# Patient Record
(demographics unavailable — no encounter records)

---

## 2024-10-30 NOTE — HISTORY OF PRESENT ILLNESS
[FreeTextEntry1] : Mohs surgery for nBCC on the L lower eyelid [de-identified] : 10/29/2024  Referred by: Dr. Kiara Clarke  We had the pleasure of seeing your patient for Mohs Micrographic Surgery.  Joyce Sanchez is an 83yoF who presents for Mohs surgery for a nBCC on the L lower eyelid. Biopsied by Dr. Clarke 6/17/24. Present at least a year.  Closure set with Dr. May  s/p MMS for a nBCC on the L chin 07/23/2024 Has had Mohs surgery 5+ years ago on the R temple; not sure what type of skin cancer she had.   Social History: Retired. Lives alone in [place]. 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 One of her nephews is a pediatric surgeon in [place]. Upcoming travel plans: May go to [facility] in September or October for the birth of her other nephew's child.   Blood thinners: None Allergies: None Has hypertension, took her antihypertensive this AM

## 2024-10-30 NOTE — HISTORY OF PRESENT ILLNESS
[FreeTextEntry1] : Mohs surgery for nBCC on the L lower eyelid [de-identified] : 10/29/2024  Referred by: Dr. Kiara Clarke  We had the pleasure of seeing your patient for Mohs Micrographic Surgery.  Joyce Sanchez is an 83yoF who presents for Mohs surgery for a nBCC on the L lower eyelid. Biopsied by Dr. Clarke 6/17/24. Present at least a year.  Closure set with Dr. May  s/p MMS for a nBCC on the L chin 07/23/2024 Has had Mohs surgery 5+ years ago on the R temple; not sure what type of skin cancer she had.   Social History: Retired. Lives alone in [place]. 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 One of her nephews is a pediatric surgeon in [place]. Upcoming travel plans: May go to [facility] in September or October for the birth of her other nephew's child.   Blood thinners: None Allergies: None Has hypertension, took her antihypertensive this AM

## 2024-10-30 NOTE — PHYSICAL EXAM
[Alert] : alert [Oriented x 3] : ~L oriented x 3 [Well Nourished] : well nourished [FreeTextEntry3] : L lower eyelid - 0.8x0.8 cm pink patch

## 2024-11-04 NOTE — PHYSICAL EXAM
[Alert] : alert [Oriented x 3] : ~L oriented x 3 [Conjunctiva Non-injected] : conjunctiva non-injected [Full Body Skin Exam Performed] : performed [Well Nourished] : well nourished [FreeTextEntry3] :  Full body skin exam Total body skin exam performed including hair, scalp, face, neck, chest/breasts/axillae, abdomen, back, R upper ext, L upper ext, buttock/groin, R lower ext, L lower ext, hands, feet, digits/nails. The following of note -pink scaly papule b/l temples x2 along hairline -tan/brown macules scattered throughout trunk/extremities, no concerning features on dermoscopy -tan/brown greasy stuck-on papules scattered throughout trunk/extremities -Mohs scar L chin and L lower eyelid, healing w/o e/o recurrence  -L medial lower leg with bony overgrowth at L medial knee

## 2024-11-04 NOTE — HISTORY OF PRESENT ILLNESS
[FreeTextEntry1] : Fu - dermatitis; flaking [de-identified] : 82 yo F presenting for:   #Flaking along hairline - 3/27/23: used the keto cream to the hairline which helped for about two weeks but since then has progressed and is very itchy. She is using a sulfate free tammi shampoo daily  - 7/26/23: used Ciclopirox once, uses hydrocort BID PRN in cycles with improvement on forehead. Mometasone PRN works on scalp - 6/2024: scalp stable with ciclopirox and mometasone solution PRN, hairline using HC PRN few days at a time but not going away completely - 11/2024: scalp stable, uses ciclopirox prn, notes improvement in itch   #H/o nBCC Chin s/p Mohs 07/2024 L lower eyelid s/p Mohs and plastic surgery repair (w/ Dr Chery) 10/29/2024  #Moles no new, concerning spots requesting fbse   Personal hx of skin cancer: NMSC on R temple s/p Mohs many years ago; BCC on chin s/p Mohs 07/2024; BCC on L lower eyelid s/p Mohs and plastic surgery repair (w/ Dr Chery) 10/29/2024 FHx of skin cancer: none Social Hx: retired

## 2024-11-04 NOTE — ASSESSMENT
[FreeTextEntry1] : #Actinic keratosis, b/l temples x2 I discussed the pre cancerous nature of this lesion with patient. Cryotherapy performed today as below   The patient was informed of the pathophysiology of their lesions and their treatment course with liquid nitrogen (cryosurgery). Side effects include blister formation, hypopigmentation, and scarring, as well as permanent nail dystrophy if applicable. Patient was verbally consented and the lesions identified above were treated with liquid nitrogen freeze, thaw, freeze each cycle x 2. The patient tolerated the procedure well. Wound care instructions, care of a blister with vaseline, signs and symptoms of infection were discussed in full. The patient denies any questions at this time.  #Seborrheic dermatitis, scalp and forehead, severe - chronic; stable though with recurrent flares ?component of ACD to hair dye, though pt denies irritation with dye use - Diagnosis, chronic nature, disease course, treatment options and goals of therapy discussed - Forehead:  C/w hydrocortisone 2.5% ointment BID PRN for 1 week on/1 week OFF cycles. SED. - Scalp: C/w ciclopirox shampoo TIW for maintenance. Leave on for 5-10 min then rinse. SED.  - Scalp: PRN flares, apply mometasone solution 3 nights per week as needed.  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 Recommended OTC sunscreen products (EltaMD/Neutrogena/La Roche Posay), including SPF30+ with broadband UV protection as well as proper use. Discussed OTC sun protective clothing - Counseled patient to monitor for changes, including mole monitoring and self-skin exams

## 2025-01-13 NOTE — REVIEW OF SYSTEMS
[As Noted in HPI] : as noted in HPI [Limb Swelling] : limb swelling [Skin Wound] : skin wound [Negative] : Heme/Lymph [Joint Swelling] : no joint swelling [Joint Stiffness] : no joint stiffness [Limb Pain] : no limb pain [Itching] : no itching

## 2025-01-13 NOTE — PHYSICAL EXAM
[Alert] : alert [Sclera] : the sclera and conjunctiva were normal [EOMI] : extraocular movements were intact [Normal Outer Ear/Nose] : the ears and nose were normal in appearance [Normal Appearance] : the appearance of the neck was normal [Supple] : the neck was supple [No Respiratory Distress] : no respiratory distress [No Acc Muscle Use] : no accessory muscle use [Respiration, Rhythm And Depth] : normal respiratory rhythm and effort [Auscultation Breath Sounds / Voice Sounds] : lungs were clear to auscultation bilaterally [Normal S1, S2] : normal S1 and S2 [Heart Rate And Rhythm] : heart rate was normal and rhythm regular [Pedal Pulses Normal] : the pedal pulses are present [Bowel Sounds] : normal bowel sounds [Abdomen Tenderness] : non-tender [Abdomen Soft] : soft [No Spinal Tenderness] : no spinal tenderness [No Clubbing, Cyanosis] : no clubbing or cyanosis of the fingernails [Motor Tone] : muscle strength and tone were normal [No Focal Deficits] : no focal deficits [Oriented To Time, Place, And Person] : oriented to person, place, and time [Normal Affect] : the affect was normal [Normal Mood] : the mood was normal [Both Tympanic Membranes Were Examined] : both tympanic membranes were normal [Normal Gait] : abnormal gait [de-identified] : 1+ Edema on the Left Lower Extremity (unchanged from baseline).  Varicose veins bilaterally, L>R [de-identified] : There is no skin breakdown or lower extremities.  Diffuse onychomycosis of her toenails

## 2025-01-13 NOTE — HISTORY OF PRESENT ILLNESS
[Patient declined breast sonogram] : Patient declined osteoporosis screening [Patient reported hearing was abnormal] : Patient reported hearing was abnormal [Patient reported vision is normal] : Patient reported vision is normal [Patient reported dental screening is abnormal] : Patient reported dental screening is abnormal [Patient reported colon/rectal/cancer screening was normal] : Patient reported colon/rectal cancer screening was normal [Patient reported skin cancer screening was normal] : Patient reported skin cancer screening was normal [Patient reported breast cancer screening was normal] : Patient reported breast cancer screening was normal [No falls in past year] : Patient reported no falls in the past year [Patient is independent with] : bathing [Completely Independent] : Completely independent. 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 BCCs s/p Mohs 2024 x 2 [FreeTextEntry3] : 07/24 07/23 [FreeTextEntry4] : birads 1 [Driving Concerns] : not driving or driving without noted concerns [ENS4Zhola] : 0 [AdvancecareDate] : 01/25

## 2025-06-23 NOTE — HISTORY OF PRESENT ILLNESS
[FreeTextEntry1] : Fu - dermatitis; flaking [de-identified] : 82 yo F presenting for below. LV Nov 2024 for fbse  #Flaking along hairline - 3/27/23: used the keto cream to the hairline which helped for about two weeks but since then has progressed and is very itchy. She is using a sulfate free tammi shampoo daily  - 7/26/23: used Ciclopirox once, uses hydrocort BID PRN in cycles with improvement on forehead. Mometasone PRN works on scalp - 6/2024: scalp stable with ciclopirox and mometasone solution PRN, hairline using HC PRN few days at a time but not going away completely - 11/2024: scalp stable, uses ciclopirox prn, notes improvement in itch - 6/2025: flaring on hairline, pt notes during her off weeks from hydrocortisone she flares so she stopped. Not using ciclopirox shampoo consistently, just as needed   Personal hx of skin cancer: NMSC on R temple s/p Mohs many years ago; BCC on chin s/p Mohs 07/2024; BCC on L lower eyelid s/p Mohs and plastic surgery repair (w/ Dr Chery) 10/29/2024 FHx of skin cancer: none Social Hx: retired

## 2025-06-23 NOTE — PHYSICAL EXAM
[Alert] : alert [Oriented x 3] : ~L oriented x 3 [Well Nourished] : well nourished [Conjunctiva Non-injected] : conjunctiva non-injected [Declined] : declined [FreeTextEntry3] : - greasy yellow scaling along hairline

## 2025-06-23 NOTE — ASSESSMENT
[FreeTextEntry1] : #Seborrheic dermatitis, scalp and forehead, severe - chronic; flaring  ?component of ACD to hair dye, though pt denies irritation with dye use - Diagnosis, chronic nature, disease course, treatment options and goals of therapy discussed - Forehead: Start tacrolimus 0.1% ointment BID PRN itch, safer for daily use STOP hydrocortisone - Scalp: RESTART ciclopirox shampoo TIW for maintenance. Leave on for 5-10 min then rinse. SED.  - Scalp: PRN flares, apply mometasone solution 3 nights per week as needed.  Long term steroid use side effects such as skin atrophy, hypopigmentation and telangiectasis discussed Patient agrees to use topical steroids sparingly and as prescribed  #Hand dermatitis - chronic; stable, well controlled with mometasone  - c/w mometasone cream (already has) BID PRN itchiness up to 2 weeks on 1 week off - Gentle skin care reviewed  RTC Nov 2025 for fbse

## 2025-07-16 NOTE — PHYSICAL EXAM
[Normocephalic] : normocephalic [EOMI] : extra ocular movement intact [Supple] : supple [No Supraclavicular Adenopathy] : no supraclavicular adenopathy [No Cervical Adenopathy] : no cervical adenopathy [de-identified] : R breast/axilla/supraclavicular area: No evidence of recurrence [de-identified] : L chest wall/axilla/supraclavicular area: No evidence of recurrence \par

## 2025-07-16 NOTE — HISTORY OF PRESENT ILLNESS
[FreeTextEntry1] : Patient is a 85yo F who presents for breast cancer surveillance. S/p L TM and SNBx w/ R nloc lumpx in 2006  (age 68) at [facility] with Dr. 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 S/p Arimidex x 3 years, no RT. Patient previously followed by Dr. Grullon. No FHx of breast ca. Patient denies any palpable masses, skin changes, or nipple discharge  TNM Staging: pTis, pNX, pMX  5/1/17: R MG- MARK, clips at site of prior bx. BIRADS 1. 5/1/18: R MG- scattered areas of fibroglandular density, post-op distortion. BIRADS 1. 5/1/19: R MG- MARK. BIRADS 1. 7/7/20: R MG- MARK. BIRADS 1. 7/7/21: R MG- Fibroglandular. R marker. BIRADS 1 7/7/22: R MG- scattered fibroglandular. MARK. BI-RADS 1 7/10/23: R MG- scattered fibroglandular. MARK. BIRADS 1. 7/10/24: R MG- scattered fibroglandular. MARK. BIRADS 1. 7/9/25: R MG ([facility]/R)-scattered fibroglandular.  MARK.  BI-RADS 1

## 2025-07-16 NOTE — HISTORY OF PRESENT ILLNESS
[FreeTextEntry1] : Patient is a 83yo F who presents for breast cancer surveillance. S/p L TM and SNBx w/ R nloc lumpx in 2006  (age 68) at [facility] with Dr. Maia Lowry for L multifocal DCIS (ER/MN+) and R DCIS (ER/MN+). S/p Arimidex x 3 years, no RT. Patient previously followed by Dr. Grullon. No FHx of breast ca. Patient denies any palpable masses, skin changes, or nipple discharge  TNM Staging: pTis, pNX, pMX  5/1/17: R MG- MARK, clips at site of prior bx. BIRADS 1. 5/1/18: R MG- scattered areas of fibroglandular density, post-op distortion. BIRADS 1. 5/1/19: R MG- MARK. BIRADS 1. 7/7/20: R MG- MARK. BIRADS 1. 7/7/21: R MG- Fibroglandular. R marker. BIRADS 1 7/7/22: R MG- scattered fibroglandular. MARK. BI-RADS 1 7/10/23: R MG- scattered fibroglandular. MARK. BIRADS 1. 7/10/24: R MG- scattered fibroglandular. MARK. BIRADS 1. 7/9/25: R MG ([facility]/R)-scattered fibroglandular.  MARK.  BI-RADS 1

## 2025-07-16 NOTE — PHYSICAL EXAM
[Normocephalic] : normocephalic [EOMI] : extra ocular movement intact [Supple] : supple [No Supraclavicular Adenopathy] : no supraclavicular adenopathy [No Cervical Adenopathy] : no cervical adenopathy [de-identified] : R breast/axilla/supraclavicular area: No evidence of recurrence [de-identified] : L chest wall/axilla/supraclavicular area: No evidence of recurrence \par